# Patient Record
Sex: MALE | Race: WHITE | NOT HISPANIC OR LATINO | Employment: FULL TIME | URBAN - NONMETROPOLITAN AREA
[De-identification: names, ages, dates, MRNs, and addresses within clinical notes are randomized per-mention and may not be internally consistent; named-entity substitution may affect disease eponyms.]

---

## 2021-02-19 ENCOUNTER — ANESTHESIA EVENT (OUTPATIENT)
Dept: GASTROENTEROLOGY | Facility: HOSPITAL | Age: 36
End: 2021-02-19

## 2021-02-19 ENCOUNTER — ANESTHESIA (OUTPATIENT)
Dept: GASTROENTEROLOGY | Facility: HOSPITAL | Age: 36
End: 2021-02-19

## 2021-02-19 ENCOUNTER — HOSPITAL ENCOUNTER (OUTPATIENT)
Facility: HOSPITAL | Age: 36
Setting detail: HOSPITAL OUTPATIENT SURGERY
Discharge: HOME | End: 2021-02-19
Attending: SURGERY | Admitting: SURGERY

## 2021-02-19 DIAGNOSIS — K22.2 ESOPHAGEAL STRICTURE: ICD-10-CM

## 2021-02-19 PROBLEM — T18.108A ESOPHAGEAL FOREIGN BODY: Status: ACTIVE | Noted: 2021-02-19

## 2021-02-19 PROCEDURE — 99219 PR INITIAL OBSERVATION CARE/DAY 50 MINUTES: CPT | Performed by: SURGERY

## 2021-02-19 PROCEDURE — 25010000002 PROPOFOL 200 MG/20ML EMULSION: Performed by: NURSE ANESTHETIST, CERTIFIED REGISTERED

## 2021-02-19 PROCEDURE — 43247 EGD REMOVE FOREIGN BODY: CPT | Performed by: SURGERY

## 2021-02-19 PROCEDURE — 43239 EGD BIOPSY SINGLE/MULTIPLE: CPT | Performed by: SURGERY

## 2021-02-19 RX ORDER — ONDANSETRON 2 MG/ML
4 INJECTION INTRAMUSCULAR; INTRAVENOUS ONCE AS NEEDED
Status: DISCONTINUED | OUTPATIENT
Start: 2021-02-19 | End: 2021-02-20 | Stop reason: HOSPADM

## 2021-02-19 RX ORDER — SODIUM CHLORIDE 9 MG/ML
INJECTION, SOLUTION INTRAVENOUS CONTINUOUS PRN
Status: DISCONTINUED | OUTPATIENT
Start: 2021-02-19 | End: 2021-02-19 | Stop reason: SURG

## 2021-02-19 RX ORDER — HYDROCODONE BITARTRATE AND ACETAMINOPHEN 10; 325 MG/1; MG/1
1 TABLET ORAL EVERY 6 HOURS PRN
COMMUNITY

## 2021-02-19 RX ORDER — KETAMINE HCL IN NACL, ISO-OSM 100MG/10ML
SYRINGE (ML) INJECTION AS NEEDED
Status: DISCONTINUED | OUTPATIENT
Start: 2021-02-19 | End: 2021-02-19 | Stop reason: SURG

## 2021-02-19 RX ORDER — PROPOFOL 10 MG/ML
INJECTION, EMULSION INTRAVENOUS AS NEEDED
Status: DISCONTINUED | OUTPATIENT
Start: 2021-02-19 | End: 2021-02-19 | Stop reason: SURG

## 2021-02-19 RX ORDER — TIZANIDINE 4 MG/1
4 TABLET ORAL DAILY
COMMUNITY

## 2021-02-19 RX ORDER — LIDOCAINE HYDROCHLORIDE 20 MG/ML
INJECTION, SOLUTION INTRAVENOUS AS NEEDED
Status: DISCONTINUED | OUTPATIENT
Start: 2021-02-19 | End: 2021-02-19 | Stop reason: SURG

## 2021-02-19 RX ORDER — GABAPENTIN 600 MG/1
600 TABLET ORAL 2 TIMES DAILY
COMMUNITY

## 2021-02-19 RX ADMIN — PROPOFOL 50 MG: 10 INJECTION, EMULSION INTRAVENOUS at 23:20

## 2021-02-19 RX ADMIN — LIDOCAINE HYDROCHLORIDE 100 MG: 20 INJECTION, SOLUTION INTRAVENOUS at 23:20

## 2021-02-19 RX ADMIN — Medication 25 MG: at 23:21

## 2021-02-19 RX ADMIN — SODIUM CHLORIDE: 9 INJECTION, SOLUTION INTRAVENOUS at 23:21

## 2021-02-19 RX ADMIN — PROPOFOL 50 MG: 10 INJECTION, EMULSION INTRAVENOUS at 23:26

## 2021-02-20 VITALS
SYSTOLIC BLOOD PRESSURE: 131 MMHG | HEART RATE: 76 BPM | OXYGEN SATURATION: 99 % | TEMPERATURE: 99.4 F | DIASTOLIC BLOOD PRESSURE: 79 MMHG | RESPIRATION RATE: 17 BRPM | WEIGHT: 265 LBS

## 2021-02-20 NOTE — ANESTHESIA POSTPROCEDURE EVALUATION
Patient: Jese Shaw    Procedure Summary     Date: 02/19/21 Room / Location: [facility] ENDOSCOPY 2 / [facility]    Anesthesia Start: 2321 Anesthesia Stop: 2332    Procedure: ESOPHAGOGASTRODUODENOSCOPY WITH FOREIGN BODY REMOVAL AND BIOPSY (N/A ) Diagnosis:     Surgeon: Jamaal Valentin MD Provider: Lion Gonzalez CRNA    Anesthesia Type: MAC ASA Status: 3 - Emergent          Anesthesia Type: MAC    Vitals  No vitals data found for the desired time range.          Post Anesthesia Care and Evaluation    Patient location during evaluation: bedside  Patient participation: complete - patient participated  Level of consciousness: awake and alert  Pain score: 0  Pain management: adequate  Airway patency: patent  Anesthetic complications: No anesthetic complications  PONV Status: none  Cardiovascular status: acceptable  Respiratory status: acceptable  Hydration status: acceptable

## 2021-02-20 NOTE — H&P
[facility]   HISTORY AND PHYSICAL      Name:  Jese Shaw   Age:  35 y.o.  Sex:  male  :  1985  MRN:  [id number]   Visit Number:  [id number]  Admission Date:  2021  Date Of Service:  21  Primary Care Physician:  Nellie López MD    Chief Complaint:     I have food stuck in my throat    History Of Presenting Illness:      Patient was eating this afternoon approximately 4 PM.  At that time he swallowed a piece of meat that has since not passed.  He is unable to tolerate his saliva.  He has had this happen once before and it resolved on its own.  He is never had work-up in the past.  He has mid sternal pressure pain as well although this has improved.    Review Of Systems:     General ROS: Patient denies any fevers, chills or loss of consciousness.  No complaints of generalized weakness  Psychological ROS: Denies any hallucinations and delusions.  Respiratory ROS: Denies cough or shortness of breath.   Cardiovascular ROS: As per HPI  Gastrointestinal ROS: As per HPI  Genito-Urinary ROS: Denies dysuria or hematuria.  Neurological ROS: Denies any focal weakness. No loss of consciousness. Denies any numbness.   Dermatological ROS: Denies any redness or pruritis.     Past Medical History:    History reviewed. No pertinent past medical history.    Past Surgical history:    Past Surgical History:   Procedure Laterality Date   • BACK SURGERY  2005   • BACK SURGERY  2005    repaired crushed vertebrae in lower back   • BACK SURGERY  2005    for crushed vertebra in lower back       Social History:    Social History     Socioeconomic History   • Marital status:      Spouse name: Not on file   • Number of children: Not on file   • Years of education: Not on file   • Highest education level: Not on file   Tobacco Use   • Smoking status: Former Smoker     Types: Cigarettes     Quit date: 2015     Years since quittin.6   • Smokeless tobacco: Former  User   Substance and Sexual Activity   • Alcohol use: Not Currently     Comment: quit  few years  ago   • Drug use: Never   • Sexual activity: Defer       Family History:    History reviewed. No pertinent family history.    Allergies:      Patient has no known allergies.    Home Medications:    Prior to Admission Medications     Prescriptions Last Dose Informant Patient Reported? Taking?    gabapentin (NEURONTIN) 600 MG tablet 2/18/2021 Self Yes Yes    Take 600 mg by mouth 2 (Two) Times a Day.    HYDROcodone-acetaminophen (NORCO)  MG per tablet 2/18/2021 Self Yes Yes    Take 1 tablet by mouth Every 6 (Six) Hours As Needed for Moderate Pain  (back pain,HX: crushed vertebrae work injury). Can take qid    tiZANidine (ZANAFLEX) 4 MG tablet  Self Yes Yes    Take 4 mg by mouth Daily.             ED Medications:    Medications - No data to display    Vital Signs:    Heart Rate:  [91] 91  Resp:  [18] 18  BP: (125)/(78) 125/78    There were no vitals filed for this visit.    There is no height or weight on file to calculate BMI.    Physical Exam:      General Appearance:  Alert and cooperative, not in any acute distress.   Head:  Atraumatic and normocephalic, without obvious abnormality.   Eyes:          PERRLA, conjunctivae and sclerae normal, no Icterus. No pallor. Extra-occular movements are within normal limits.   Ears:  Ears appear intact with no abnormalities noted.   Respiratory/Lungs:   Breath sounds heard bilaterally equally.  No crackles or wheezing. No Pleural rub or bronchial breathing. Normal respiratory effort.    Cardiovascular/Heart:  Normal S1 and S2,    GI/Abdomen:   No masses, no hepatosplenomegaly. Soft, non-tender, non-distended, no hernia                 Musculoskeletal/ Extremities:   Moves all extremities well   Skin: No bleeding, bruising or rash, no induration   Psychiatric : Alert and oriented ×3.  No depression or anxiety    Neurologic: Cranial nerves 2 - 12 grossly intact, sensation intact,  Motor power is normal and equal bilaterally.       EKG:          Labs:    Lab Results (last 24 hours)     ** No results found for the last 24 hours. **          Radiology:    Imaging Results (Last 72 Hours)     ** No results found for the last 72 hours. **          Assessment:    Esophageal foreign body    Plan:     I recommend an EGD with esophageal foreign body removal to the patient.  I explained this procedure to him.  He understands the procedure.  He also understands the risks of bleeding as well as esophageal injury/perforation and potential aspiration.  He understands all of these and the ramifications and he wishes to proceed.  If indicated and if we can proceed safely I may proceed with esophageal dilation as well as necessary and he understands the associated risks of bleeding and perforation relating to this.  He understands that I may not be able to perform this maneuver and may have to do this at a subsequent date.    Jamaal Valentin MD  02/19/21  23:02 EST

## 2021-02-20 NOTE — ANESTHESIA PREPROCEDURE EVALUATION
Anesthesia Evaluation     Patient summary reviewed and Nursing notes reviewed   NPO Solid Status: > 6 hours  NPO Liquid Status: > 6 hours           Airway   Mallampati: II  TM distance: >3 FB  Neck ROM: full  No difficulty expected  Dental      Pulmonary    Cardiovascular         Neuro/Psych  GI/Hepatic/Renal/Endo    (+) obesity,  hiatal hernia, GERD, PUD,      Musculoskeletal     (+) back pain, chronic pain,   Abdominal    Substance History      OB/GYN          Other                        Anesthesia Plan    ASA 3 - emergent     MAC     intravenous induction     Anesthetic plan, all risks, benefits, and alternatives have been provided, discussed and informed consent has been obtained with: patient.    Plan discussed with CRNA.

## 2021-02-23 NOTE — PROGRESS NOTES
Patient: Jese Shaw    YOB: 1985    Date: 02/26/2021    Primary Care Provider: Nellie López MD    Reason for Consultation: Follow-up EGD    Chief Complaint   Patient presents with   • Follow-up     egd with biopsy done secondary to dysphagia.       History of present illness: Patient here in follow-up after his EGD with esophageal foreign body removal.  He continues to have some dysphagia especially to solids.  He has no chest pain or abdominal pain.    The following portions of the patient's history were reviewed and updated as appropriate: allergies, current medications, past family history, past medical history, past social history, past surgical history and problem list.    Review of Systems   Constitutional: Negative for chills, fever and unexpected weight change.   HENT: Negative for trouble swallowing and voice change.    Eyes: Negative for visual disturbance.   Respiratory: Negative for apnea, cough, chest tightness, shortness of breath and wheezing.    Cardiovascular: Negative for chest pain, palpitations and leg swelling.   Gastrointestinal: Negative for abdominal distention, abdominal pain, anal bleeding, blood in stool, constipation, diarrhea, nausea, rectal pain and vomiting.   Endocrine: Negative for cold intolerance and heat intolerance.   Genitourinary: Negative for difficulty urinating, dysuria, flank pain, scrotal swelling and testicular pain.   Musculoskeletal: Negative for back pain, gait problem and joint swelling.   Skin: Negative for color change, rash and wound.   Neurological: Negative for dizziness, syncope, speech difficulty, weakness, numbness and headaches.   Hematological: Negative for adenopathy. Does not bruise/bleed easily.   Psychiatric/Behavioral: Negative for confusion. The patient is not nervous/anxious.        Vital Signs:   Vitals:    02/26/21 1011   BP: 148/86   Pulse: 88   Resp: 16   Temp: 98 °F (36.7 °C)   TempSrc: Temporal   SpO2: 99%   Weight: 120 kg (265  "lb)   Height: 182.9 cm (72\")     History:  History reviewed. No pertinent past medical history.    Past Surgical History:   Procedure Laterality Date   • BACK SURGERY  2005   • BACK SURGERY  2005    repaired crushed vertebrae in lower back   • BACK SURGERY  2005    for crushed vertebra in lower back   • ENDOSCOPY WITH FOREIGN BODY REMOVAL N/A 2021    Procedure: ESOPHAGOGASTRODUODENOSCOPY WITH FOREIGN BODY REMOVAL AND BIOPSY;  Surgeon: Jamaal Valentin MD;  Location: [facility] ENDOSCOPY;  Service: General;  Laterality: N/A;       History reviewed. No pertinent family history.    Social History     Tobacco Use   • Smoking status: Former Smoker     Types: Cigarettes     Quit date: 2015     Years since quittin.6   • Smokeless tobacco: Former User   Substance Use Topics   • Alcohol use: Not Currently     Comment: quit  few years  ago   • Drug use: Never      Allergies:  No Known Allergies    Medications:    Current Outpatient Medications:   •  gabapentin (NEURONTIN) 600 MG tablet, Take 600 mg by mouth 2 (Two) Times a Day., Disp: , Rfl:   •  HYDROcodone-acetaminophen (NORCO)  MG per tablet, Take 1 tablet by mouth Every 6 (Six) Hours As Needed for Moderate Pain  (back pain,HX: crushed vertebrae work injury). Can take qid, Disp: , Rfl:   •  tiZANidine (ZANAFLEX) 4 MG tablet, Take 4 mg by mouth Daily., Disp: , Rfl:     Physical Exam:   General Appearance:   Alert and oriented x3 s   Abdomen:    Soft nontender nondistended   Chest:     Clear to auscultation            Cor: Regular rate and rhythm      Results Review:   I reviewed the patient's new clinical results.  Pathology was reviewed    Review of Systems was reviewed and confirmed as accurate as documented by the MA.    Assessment / Plan:    1. Eosinophilic esophagitis    2. Esophageal dysphagia      Patient continues to have symptoms of dysphagia likely related to his eosinophilic esophagitis.  I discussed with him this diagnosis.  I " recommend Flonase.  We will prescribe this for him.  I will also send him for allergy testing.  He will follow-up with me if he has any issues.  In the meanwhile I gave him dietary instructions.    Electronically signed by Jamaal Valentin MD  02/26/21

## 2021-02-25 LAB
LAB AP CASE REPORT: NORMAL
PATH REPORT.FINAL DX SPEC: NORMAL

## 2021-02-26 ENCOUNTER — TELEPHONE (OUTPATIENT)
Dept: SURGERY | Facility: CLINIC | Age: 36
End: 2021-02-26

## 2021-02-26 ENCOUNTER — OFFICE VISIT (OUTPATIENT)
Dept: SURGERY | Facility: CLINIC | Age: 36
End: 2021-02-26

## 2021-02-26 VITALS
HEIGHT: 72 IN | HEART RATE: 88 BPM | SYSTOLIC BLOOD PRESSURE: 148 MMHG | BODY MASS INDEX: 35.89 KG/M2 | DIASTOLIC BLOOD PRESSURE: 86 MMHG | OXYGEN SATURATION: 99 % | TEMPERATURE: 98 F | RESPIRATION RATE: 16 BRPM | WEIGHT: 265 LBS

## 2021-02-26 DIAGNOSIS — R13.19 ESOPHAGEAL DYSPHAGIA: ICD-10-CM

## 2021-02-26 DIAGNOSIS — K20.0 EOSINOPHILIC ESOPHAGITIS: Primary | ICD-10-CM

## 2021-02-26 PROCEDURE — 99214 OFFICE O/P EST MOD 30 MIN: CPT | Performed by: SURGERY

## 2021-04-22 RX ORDER — LEVOCETIRIZINE DIHYDROCHLORIDE 5 MG/1
5 TABLET, FILM COATED ORAL EVERY EVENING
COMMUNITY

## 2021-04-29 ENCOUNTER — OFFICE VISIT (OUTPATIENT)
Dept: GASTROENTEROLOGY | Facility: CLINIC | Age: 36
End: 2021-04-29

## 2021-04-29 VITALS
SYSTOLIC BLOOD PRESSURE: 133 MMHG | WEIGHT: 274 LBS | DIASTOLIC BLOOD PRESSURE: 82 MMHG | TEMPERATURE: 97.8 F | BODY MASS INDEX: 37.11 KG/M2 | RESPIRATION RATE: 18 BRPM | HEART RATE: 62 BPM | HEIGHT: 72 IN

## 2021-04-29 DIAGNOSIS — Z01.818 PREOP TESTING: Primary | ICD-10-CM

## 2021-04-29 DIAGNOSIS — R13.19 ESOPHAGEAL DYSPHAGIA: ICD-10-CM

## 2021-04-29 DIAGNOSIS — K20.0 ESOPHAGITIS, EOSINOPHILIC: Primary | Chronic | ICD-10-CM

## 2021-04-29 PROBLEM — T18.108A ESOPHAGEAL FOREIGN BODY: Status: RESOLVED | Noted: 2021-02-19 | Resolved: 2021-04-29

## 2021-04-29 PROCEDURE — 99204 OFFICE O/P NEW MOD 45 MIN: CPT | Performed by: NURSE PRACTITIONER

## 2021-04-29 RX ORDER — NICOTINE POLACRILEX 4 MG/1
20 GUM, CHEWING ORAL 2 TIMES DAILY
Qty: 60 EACH | Refills: 4 | Status: SHIPPED | OUTPATIENT
Start: 2021-04-29 | End: 2021-10-18 | Stop reason: SDUPTHER

## 2021-04-29 RX ORDER — SODIUM CHLORIDE 9 MG/ML
70 INJECTION, SOLUTION INTRAVENOUS CONTINUOUS PRN
Status: CANCELLED | OUTPATIENT
Start: 2021-04-29

## 2021-04-29 RX ORDER — DIPHENOXYLATE HYDROCHLORIDE AND ATROPINE SULFATE 2.5; .025 MG/1; MG/1
TABLET ORAL DAILY
COMMUNITY

## 2021-04-29 NOTE — PATIENT INSTRUCTIONS
Antireflux measures: Avoid fried, fatty foods, alcohol, chocolate, coffee, tea,  soft drinks, peppermint and spearmint, spicy foods, tomatoes and tomato based foods, onion based foods, and smoking. Other antireflux measures include weight reduction if overweight, avoiding tight clothing around the abdomen, elevating the head of the bed 6 inches with blocks under the head board, and don't drink or eat before going to bed and avoid lying down immediately after meals.  Omeprazole 20 mg 1 po twice a day  Budesonide slurry for a total of 8 weeks  EGD in 6 months   Upper endoscopy-EGD: Description of the procedure, risks, benefits, alternatives and options, including nonoperative options, were discussed with the patient in detail. The patient understands and wishes to proceed.   COVID-19 testing prior to procedure. The patient will need to self-quarantine after testing until the procedure. Instructions given to patient.

## 2021-04-29 NOTE — PROGRESS NOTES
New Patient Consult      Date: 2021   Patient Name: Jese Shaw  MRN: [id number]  : 1985     Primary Care Provider: Nellie López MD    Chief Complaint   Patient presents with   • Difficulty Swallowing     History of Present Illness: Jese Shaw is a 35 y.o. male who is here today to establish care with Gastroenterology for difficulty swallowing.     For the past 6 months he has been having difficulty swallowing solids. He had EGD in 2021 by Dr. Valentin, biopsies with EOE. He was placed on Fluticasone and sent to allergist. He has been to allergist and has been started on PO Budesonide once a day.  Difficulty swallowing has significantly improved. He had allergy testing and did not have any food allergies, only allergies to dust, trees and grasses. He had asthma as a child, but none as an adult. Patient denies heartburn or reflux.      The patient denies recent change in bowel habits. There is no diarrhea or constipation. There is no history of abdominal pain. There is no history of overt GI bleed (hematemesis melena or hematochezia). The patient denies nausea or vomiting. There is no history of recent significant weight loss. There is no history of liver disease in the past. There is no family history of colon cancer. The patient has not had a colonoscopy in the past.    Subjective      Past Medical History:   Diagnosis Date   • Back pain    • Eosinophilic esophagitis 2021   • Tattoos      Past Surgical History:   Procedure Laterality Date   • BACK SURGERY  2005   • BACK SURGERY  2005    repaired crushed vertebrae in lower back   • BACK SURGERY  2005    for crushed vertebra in lower back   • ENDOSCOPY WITH FOREIGN BODY REMOVAL N/A 2021    Procedure: ESOPHAGOGASTRODUODENOSCOPY WITH FOREIGN BODY REMOVAL AND BIOPSY;  Surgeon: Jamaal Valentin MD;  Location: [facility] ENDOSCOPY;  Service: General;  Laterality: N/A;     Family History   Problem Relation Age of Onset    • GI problems Father    • Skin cancer Father    • Colon cancer Neg Hx      Social History     Socioeconomic History   • Marital status:      Spouse name: Not on file   • Number of children: Not on file   • Years of education: Not on file   • Highest education level: Not on file   Tobacco Use   • Smoking status: Former Smoker     Types: Cigarettes     Quit date: 2015     Years since quittin.8   • Smokeless tobacco: Former User   Vaping Use   • Vaping Use: Never used   Substance and Sexual Activity   • Alcohol use: Not Currently     Comment: quit  few years  ago   • Drug use: Never   • Sexual activity: Defer       Current Outpatient Medications:   •  BUDESONIDE PO, Take  by mouth., Disp: , Rfl:   •  gabapentin (NEURONTIN) 600 MG tablet, Take 600 mg by mouth 2 (Two) Times a Day., Disp: , Rfl:   •  HYDROcodone-acetaminophen (NORCO)  MG per tablet, Take 1 tablet by mouth Every 6 (Six) Hours As Needed for Moderate Pain  (back pain,HX: crushed vertebrae work injury). Can take qid, Disp: , Rfl:   •  levocetirizine (XYZAL) 5 MG tablet, Take 5 mg by mouth Every Evening., Disp: , Rfl:   •  multivitamin (MULTI-VITAMIN DAILY PO), Take  by mouth Daily., Disp: , Rfl:   •  tiZANidine (ZANAFLEX) 4 MG tablet, Take 4 mg by mouth Daily., Disp: , Rfl:   •  Omeprazole 20 MG tablet delayed-release, Take 20 mg by mouth 2 (Two) Times a Day., Disp: 60 each, Rfl: 4    No Known Allergies     The following portions of the patient's history were reviewed and updated as appropriate: allergies, current medications, past family history, past medical history, past social history, past surgical history and problem list.    Objective     Physical Exam  Vitals and nursing note reviewed.   Constitutional:       General: He is not in acute distress.     Appearance: Normal appearance. He is well-developed. He is not diaphoretic.   HENT:      Head: Normocephalic and atraumatic.      Right Ear: Hearing and external ear normal.       "Left Ear: Hearing and external ear normal.      Nose: Nose normal.      Mouth/Throat:      Mouth: Mucous membranes are not pale, not dry and not cyanotic.   Eyes:      General: Lids are normal.         Right eye: No discharge.         Left eye: No discharge.      Conjunctiva/sclera: Conjunctivae normal.   Neck:      Thyroid: No thyroid mass or thyromegaly.      Vascular: No JVD.      Trachea: Trachea normal.   Cardiovascular:      Rate and Rhythm: Normal rate and regular rhythm.      Heart sounds: Normal heart sounds and S2 normal. No murmur heard.   No friction rub. No gallop. No S3 sounds.    Pulmonary:      Effort: Pulmonary effort is normal. No respiratory distress.      Breath sounds: Normal breath sounds.   Chest:      Chest wall: No tenderness.   Abdominal:      General: Bowel sounds are normal. There is no distension.      Palpations: Abdomen is soft. Abdomen is not rigid. There is no hepatomegaly, splenomegaly or mass.      Tenderness: There is no abdominal tenderness. There is no guarding or rebound.      Hernia: No hernia is present.   Musculoskeletal:      Cervical back: Neck supple. No edema.   Lymphadenopathy:      Cervical: No cervical adenopathy.      Upper Body:      Left upper body: No supraclavicular adenopathy.   Skin:     General: Skin is warm and dry.      Coloration: Skin is not pale.      Findings: No rash.      Nails: There is no clubbing.   Neurological:      Mental Status: He is alert and oriented to person, place, and time.      Cranial Nerves: No cranial nerve deficit.      Sensory: No sensory deficit.   Psychiatric:         Mood and Affect: Mood normal.         Speech: Speech normal.         Behavior: Behavior is cooperative.       Vitals:    04/29/21 1106   BP: 133/82   Pulse: 62   Resp: 18   Temp: 97.8 °F (36.6 °C)   Weight: 124 kg (274 lb)   Height: 182.9 cm (72\")     Results Review:   I have reviewed the patient's new clinical and imaging results.    Admission on 02/19/2021, " Discharged on 02/19/2021   Component Date Value Ref Range Status   • Case Report 02/19/2021    Final                    Value:Surgical Pathology Report                         Case: [id number]                                  Authorizing Provider:  Jamaal Valentin MD      Collected:           02/19/2021 11:26 PM          Ordering Location:     [facility]    Received:            02/22/2021 08:40 AM                                 [facility]                                                                    Pathologist:           Merlin Porter MD                                                     Specimen:    Esophagus                                                                                 • Final Diagnosis 02/19/2021    Final                    Value:This result contains rich text formatting which cannot be displayed here.      No radiology results for the last 90 days.     EGD per Dr. Jamaal Valentin dated 2/19/2021 normal examined duodenum.  Normal stomach.  Food at the gastroesophageal junction, removal successful.  Biopsied for possible EOE.  Esophagus biopsies with eosinophil rich esophagitis.  Histologic features favoring a true EOE.    Assessment / Plan      1. Esophagitis, eosinophilic  2. Esophageal dysphagia  History of difficulty swallowing solids for the past 6 months that had gradually worsened. Patient had EGD by Dr. Valentin in February 2021, biosies with EOE. Patient was started on fluticasone inhaler and referred to allergy and asthma, Dr. Spain, who started him on Budesonide slurry. Patient had allergy testing with no food allergies noted, only allergies to trees, dust and grasses. He is on Xyzal. Patient has not had any further episodes of difficulty swallowing.  Anti-reflux measures  Omeprazole 20 mg 1 po twice a day  Complete Budesonide slurry x 8 weeks  EGD in 6 months to evaluate EOE    - Omeprazole 20 MG tablet delayed-release; Take 20 mg by mouth 2 (Two) Times a  Day.  Dispense: 60 each; Refill: 4  - Case Request; Standing  - Implement Anesthesia Orders Day of Procedure; Standing  - Obtain Informed Consent; Standing  - Oxygen Therapy- Nasal Cannula; 2 LPM; Titrate for SPO2: equal to or greater than, 90%; Standing  - sodium chloride 0.9 % infusion  - Case Request    Patient Instructions   Antireflux measures: Avoid fried, fatty foods, alcohol, chocolate, coffee, tea,  soft drinks, peppermint and spearmint, spicy foods, tomatoes and tomato based foods, onion based foods, and smoking. Other antireflux measures include weight reduction if overweight, avoiding tight clothing around the abdomen, elevating the head of the bed 6 inches with blocks under the head board, and don't drink or eat before going to bed and avoid lying down immediately after meals.  Omeprazole 20 mg 1 po twice a day  Budesonide slurry for a total of 8 weeks  EGD in 6 months   Upper endoscopy-EGD: Description of the procedure, risks, benefits, alternatives and options, including nonoperative options, were discussed with the patient in detail. The patient understands and wishes to proceed.   COVID-19 testing prior to procedure. The patient will need to self-quarantine after testing until the procedure. Instructions given to patient.     Tahir Stewart, APRN  4/29/2021    Please note that portions of this note may have been completed with a voice recognition program. Efforts were made to edit the dictations, but occasionally words are mistranscribed.

## 2021-10-18 DIAGNOSIS — K20.0 ESOPHAGITIS, EOSINOPHILIC: Chronic | ICD-10-CM

## 2021-10-18 RX ORDER — NICOTINE POLACRILEX 4 MG/1
20 GUM, CHEWING ORAL 2 TIMES DAILY
Qty: 60 EACH | Refills: 2 | Status: SHIPPED | OUTPATIENT
Start: 2021-10-18 | End: 2022-02-23 | Stop reason: SDUPTHER

## 2021-10-21 ENCOUNTER — TELEPHONE (OUTPATIENT)
Dept: GASTROENTEROLOGY | Facility: CLINIC | Age: 36
End: 2021-10-21

## 2021-10-21 NOTE — TELEPHONE ENCOUNTER
Called patient to reschedule procedure. Reached voicemail. Left detailed message for return call to discuss

## 2021-10-25 ENCOUNTER — TELEPHONE (OUTPATIENT)
Dept: GASTROENTEROLOGY | Facility: CLINIC | Age: 36
End: 2021-10-25

## 2021-10-25 NOTE — TELEPHONE ENCOUNTER
Called patient to reschedule upper endoscopy. Reached voicemail. Left detailed message for return call to discuss

## 2022-02-23 DIAGNOSIS — K20.0 ESOPHAGITIS, EOSINOPHILIC: Chronic | ICD-10-CM

## 2022-02-23 RX ORDER — NICOTINE POLACRILEX 4 MG/1
20 GUM, CHEWING ORAL 2 TIMES DAILY
Qty: 60 EACH | Refills: 0 | Status: SHIPPED | OUTPATIENT
Start: 2022-02-23

## (undated) DEVICE — SUCTION CANISTER, 1500CC, RIGID: Brand: DEROYAL

## (undated) DEVICE — VLV SXN AIR/H2O ORCAPOD3 1P/U STRL

## (undated) DEVICE — CONMED SCOPE SAVER BITE BLOCK, 20X27 MM: Brand: SCOPE SAVER

## (undated) DEVICE — SYR LUER SLPTP 50ML

## (undated) DEVICE — MEDI-VAC NON-CONDUCTIVE SUCTION TUBING: Brand: CARDINAL HEALTH

## (undated) DEVICE — FRCP BIOP COLD ENDOJAW ALLGTR W/NDL 2.8X2300MM BLU

## (undated) DEVICE — LUBE JELLY PK/2.75GM STRL BX/144

## (undated) DEVICE — ENDOSCOPY PORT CONNECTOR FOR OLYMPUS® SCOPES: Brand: ERBE

## (undated) DEVICE — HYBRID TUBING/CAP SET FOR OLYMPUS® SCOPES: Brand: ERBE